# Patient Record
Sex: FEMALE | URBAN - METROPOLITAN AREA
[De-identification: names, ages, dates, MRNs, and addresses within clinical notes are randomized per-mention and may not be internally consistent; named-entity substitution may affect disease eponyms.]

---

## 2022-03-14 ENCOUNTER — TELEPHONE (OUTPATIENT)
Dept: ALLERGY | Facility: CLINIC | Age: 42
End: 2022-03-14

## 2022-03-14 NOTE — TELEPHONE ENCOUNTER
----- Message from Cecy Voss sent at 3/11/2022 10:58 AM CST -----  Regarding: pts son  Patient Requesting Sooner Appointment.     Reason for sooner appt.: pts son is calling to speak with the nurse pt needs to be seen for allergies pt would like to be seen next week. Pt has a referral pt has Medicaid insurance pts son didn't have her card.   When is the first available appointment? 5/22  Communication Preference: can you please call Per at [phone]  Additional Information: none    RUFUS